# Patient Record
Sex: MALE | Race: BLACK OR AFRICAN AMERICAN | NOT HISPANIC OR LATINO | URBAN - METROPOLITAN AREA
[De-identification: names, ages, dates, MRNs, and addresses within clinical notes are randomized per-mention and may not be internally consistent; named-entity substitution may affect disease eponyms.]

---

## 2017-12-17 ENCOUNTER — EMERGENCY (EMERGENCY)
Facility: HOSPITAL | Age: 42
LOS: 1 days | Discharge: HOME | End: 2017-12-17
Attending: EMERGENCY MEDICINE | Admitting: EMERGENCY MEDICINE
Payer: SELF-PAY

## 2017-12-17 VITALS
HEART RATE: 75 BPM | DIASTOLIC BLOOD PRESSURE: 80 MMHG | TEMPERATURE: 98 F | RESPIRATION RATE: 18 BRPM | WEIGHT: 154.98 LBS | OXYGEN SATURATION: 99 % | SYSTOLIC BLOOD PRESSURE: 124 MMHG

## 2017-12-17 PROCEDURE — 99283 EMERGENCY DEPT VISIT LOW MDM: CPT | Mod: 25

## 2017-12-17 PROCEDURE — 73630 X-RAY EXAM OF FOOT: CPT | Mod: 26,RT

## 2017-12-17 RX ORDER — IBUPROFEN 200 MG
600 TABLET ORAL ONCE
Qty: 0 | Refills: 0 | Status: COMPLETED | OUTPATIENT
Start: 2017-12-17 | End: 2017-12-17

## 2017-12-17 RX ORDER — IBUPROFEN 200 MG
1 TABLET ORAL
Qty: 20 | Refills: 0
Start: 2017-12-17 | End: 2017-12-21

## 2017-12-17 RX ADMIN — Medication 600 MILLIGRAM(S): at 13:27

## 2017-12-17 NOTE — ED PROVIDER NOTE - MUSCULOSKELETAL, MLM
RLE: fluctuance cystic lesion to dorsum of R foot in the mid foot area with no surrounding erythema, mildly tender, normal dorsalis pedis pulse, normal sensation distally.

## 2017-12-17 NOTE — ED PROVIDER NOTE - DIAGNOSTIC INTERPRETATION
Interpreted by ED physician  Foot R x-ray, 3 views  No fracture, no dislocation (joint spaces grossly normal), tissue markers applied by xray tech, + soft tissue swelling

## 2017-12-17 NOTE — ED PROVIDER NOTE - MEDICAL DECISION MAKING DETAILS
Most likely a cyst. Explained that will not be drained emergently and that he should follow up with podiatry.

## 2017-12-17 NOTE — ED PROVIDER NOTE - PROGRESS NOTE DETAILS
soft tissue defect, most likely cystic, does not seem like an abscess, no need for emergent drainage. F/U w Podiatry recommended. motrin for pain

## 2017-12-17 NOTE — ED PROVIDER NOTE - OBJECTIVE STATEMENT
37 y/o M presents w/ bump to the top of R foot x2-3 months, now w/ pain x1 week, worse with movement of toes and standing. No fever or chills. No trauma or injury. No numbness/tingling. No redness, warmth, drainage.

## 2017-12-21 DIAGNOSIS — M79.671 PAIN IN RIGHT FOOT: ICD-10-CM

## 2017-12-21 DIAGNOSIS — L72.8 OTHER FOLLICULAR CYSTS OF THE SKIN AND SUBCUTANEOUS TISSUE: ICD-10-CM

## 2018-03-08 ENCOUNTER — EMERGENCY (EMERGENCY)
Facility: HOSPITAL | Age: 43
LOS: 1 days | Discharge: HOME | End: 2018-03-08
Attending: EMERGENCY MEDICINE | Admitting: EMERGENCY MEDICINE

## 2018-03-08 VITALS
OXYGEN SATURATION: 98 % | HEART RATE: 71 BPM | RESPIRATION RATE: 16 BRPM | SYSTOLIC BLOOD PRESSURE: 139 MMHG | TEMPERATURE: 98 F | DIASTOLIC BLOOD PRESSURE: 90 MMHG

## 2018-03-08 DIAGNOSIS — R07.89 OTHER CHEST PAIN: ICD-10-CM

## 2018-03-08 NOTE — ED ADULT TRIAGE NOTE - CHIEF COMPLAINT QUOTE
pt presents with chest pain for the past week, denies any cardiac history, states that he has had a cough.

## 2018-03-09 ENCOUNTER — EMERGENCY (EMERGENCY)
Facility: HOSPITAL | Age: 43
LOS: 1 days | Discharge: HOME | End: 2018-03-09
Admitting: EMERGENCY MEDICINE
Payer: SELF-PAY

## 2018-03-09 VITALS
DIASTOLIC BLOOD PRESSURE: 76 MMHG | RESPIRATION RATE: 16 BRPM | OXYGEN SATURATION: 99 % | HEART RATE: 81 BPM | TEMPERATURE: 98 F | SYSTOLIC BLOOD PRESSURE: 143 MMHG

## 2018-03-09 DIAGNOSIS — R07.89 OTHER CHEST PAIN: ICD-10-CM

## 2018-03-09 DIAGNOSIS — R07.9 CHEST PAIN, UNSPECIFIED: ICD-10-CM

## 2018-03-09 DIAGNOSIS — R50.9 FEVER, UNSPECIFIED: ICD-10-CM

## 2018-03-09 DIAGNOSIS — R05 COUGH: ICD-10-CM

## 2018-03-09 DIAGNOSIS — F17.210 NICOTINE DEPENDENCE, CIGARETTES, UNCOMPLICATED: ICD-10-CM

## 2018-03-09 LAB
ALBUMIN SERPL ELPH-MCNC: 3.8 G/DL — SIGNIFICANT CHANGE UP (ref 3.4–5)
ALP SERPL-CCNC: 99 U/L — SIGNIFICANT CHANGE UP (ref 40–120)
ALT FLD-CCNC: 28 U/L — SIGNIFICANT CHANGE UP (ref 12–42)
ANION GAP SERPL CALC-SCNC: 11 MMOL/L — SIGNIFICANT CHANGE UP (ref 9–16)
AST SERPL-CCNC: 23 U/L — SIGNIFICANT CHANGE UP (ref 15–37)
BASOPHILS NFR BLD AUTO: 0.8 % — SIGNIFICANT CHANGE UP (ref 0–2)
BILIRUB SERPL-MCNC: 0.4 MG/DL — SIGNIFICANT CHANGE UP (ref 0.2–1.2)
BUN SERPL-MCNC: 18 MG/DL — SIGNIFICANT CHANGE UP (ref 7–23)
CALCIUM SERPL-MCNC: 9 MG/DL — SIGNIFICANT CHANGE UP (ref 8.5–10.5)
CHLORIDE SERPL-SCNC: 103 MMOL/L — SIGNIFICANT CHANGE UP (ref 96–108)
CO2 SERPL-SCNC: 24 MMOL/L — SIGNIFICANT CHANGE UP (ref 22–31)
CREAT SERPL-MCNC: 0.89 MG/DL — SIGNIFICANT CHANGE UP (ref 0.5–1.3)
EOSINOPHIL NFR BLD AUTO: 4 % — SIGNIFICANT CHANGE UP (ref 0–6)
GLUCOSE SERPL-MCNC: 98 MG/DL — SIGNIFICANT CHANGE UP (ref 70–99)
HCT VFR BLD CALC: 41.5 % — SIGNIFICANT CHANGE UP (ref 39–50)
HGB BLD-MCNC: 13.9 G/DL — SIGNIFICANT CHANGE UP (ref 13–17)
IMM GRANULOCYTES NFR BLD AUTO: 0.4 % — SIGNIFICANT CHANGE UP (ref 0–1.5)
LYMPHOCYTES # BLD AUTO: 31.4 % — SIGNIFICANT CHANGE UP (ref 13–44)
MCHC RBC-ENTMCNC: 30.3 PG — SIGNIFICANT CHANGE UP (ref 27–34)
MCHC RBC-ENTMCNC: 33.5 G/DL — SIGNIFICANT CHANGE UP (ref 32–36)
MCV RBC AUTO: 90.6 FL — SIGNIFICANT CHANGE UP (ref 80–100)
MONOCYTES NFR BLD AUTO: 11.2 % — SIGNIFICANT CHANGE UP (ref 2–14)
NEUTROPHILS NFR BLD AUTO: 52.2 % — SIGNIFICANT CHANGE UP (ref 43–77)
PLATELET # BLD AUTO: 198 K/UL — SIGNIFICANT CHANGE UP (ref 150–400)
POTASSIUM SERPL-MCNC: 3.9 MMOL/L — SIGNIFICANT CHANGE UP (ref 3.5–5.3)
POTASSIUM SERPL-SCNC: 3.9 MMOL/L — SIGNIFICANT CHANGE UP (ref 3.5–5.3)
PROT SERPL-MCNC: 7.6 G/DL — SIGNIFICANT CHANGE UP (ref 6.4–8.2)
RBC # BLD: 4.58 M/UL — SIGNIFICANT CHANGE UP (ref 4.2–5.8)
RBC # FLD: 12.4 % — SIGNIFICANT CHANGE UP (ref 10.3–16.9)
SODIUM SERPL-SCNC: 138 MMOL/L — SIGNIFICANT CHANGE UP (ref 132–145)
TROPONIN I SERPL-MCNC: <0.017 NG/ML — LOW (ref 0.02–0.06)
WBC # BLD: 7.1 K/UL — SIGNIFICANT CHANGE UP (ref 3.8–10.5)
WBC # FLD AUTO: 7.1 K/UL — SIGNIFICANT CHANGE UP (ref 3.8–10.5)

## 2018-03-09 PROCEDURE — 99053 MED SERV 10PM-8AM 24 HR FAC: CPT

## 2018-03-09 PROCEDURE — 71046 X-RAY EXAM CHEST 2 VIEWS: CPT | Mod: 26

## 2018-03-09 PROCEDURE — 99285 EMERGENCY DEPT VISIT HI MDM: CPT | Mod: 25

## 2018-03-09 PROCEDURE — 93010 ELECTROCARDIOGRAM REPORT: CPT

## 2018-03-09 RX ORDER — KETOROLAC TROMETHAMINE 30 MG/ML
60 SYRINGE (ML) INJECTION ONCE
Qty: 0 | Refills: 0 | Status: DISCONTINUED | OUTPATIENT
Start: 2018-03-09 | End: 2018-03-09

## 2018-03-09 RX ADMIN — Medication 60 MILLIGRAM(S): at 05:15

## 2018-03-09 NOTE — ED ADULT NURSE REASSESSMENT NOTE - NS ED NURSE REASSESS COMMENT FT1
after blood collection, pt wants to leave the ed to get food, states he'll come back in 5 minutes. advised that he's not allowed while tx is in progress, pt insisted to leave. provider notified.

## 2018-03-09 NOTE — ED PROVIDER NOTE - MEDICAL DECISION MAKING DETAILS
EKG NSR, no acute ischemic changes. CXR shows no acute cardiopulmonary pathology, troponin negative. Pt was given Toradol 60mg IM in ED with improvement. A&Ox3. NAD. Sitting comfortably with no other complaints at this time. Will d/C with instructions to F/U with Cardio this week. Strict return precautions reviewed with pt in which pt verbalizes understanding and agrees to.

## 2018-03-09 NOTE — ED PROVIDER NOTE - OBJECTIVE STATEMENT
33 y/o M with no known significant PMH presents to ED with 3 days of intermittent chest "tightness and soreness". The discomfort is worse with movement and heavy lifting. He has not taken any medication for the pain. He states that he is an  and has been consistently lifting heavy equipment and supplies for the past week. He also reports having a "cold" for the past week with subjective fever, chills and dry cough. He reports coming here earlier today for same complaints but states he had to leave to go to work. Discomfort is described as mild at this time.    Denies headache, dizziness, syncope, SOB, palpitations, wheezing, hemoptysis, abdo pain, N/V/D, calf pain or swelling, recent travel or injury

## 2018-04-05 ENCOUNTER — EMERGENCY (EMERGENCY)
Facility: HOSPITAL | Age: 43
LOS: 1 days | Discharge: HOME | End: 2018-04-05
Attending: EMERGENCY MEDICINE | Admitting: EMERGENCY MEDICINE
Payer: SELF-PAY

## 2018-04-05 VITALS
HEART RATE: 58 BPM | RESPIRATION RATE: 16 BRPM | SYSTOLIC BLOOD PRESSURE: 152 MMHG | OXYGEN SATURATION: 100 % | TEMPERATURE: 99 F | WEIGHT: 160.06 LBS | DIASTOLIC BLOOD PRESSURE: 76 MMHG

## 2018-04-05 DIAGNOSIS — K08.89 OTHER SPECIFIED DISORDERS OF TEETH AND SUPPORTING STRUCTURES: ICD-10-CM

## 2018-04-05 PROCEDURE — 99283 EMERGENCY DEPT VISIT LOW MDM: CPT

## 2018-04-05 RX ORDER — IBUPROFEN 200 MG
600 TABLET ORAL ONCE
Qty: 0 | Refills: 0 | Status: COMPLETED | OUTPATIENT
Start: 2018-04-05 | End: 2018-04-05

## 2018-04-05 RX ORDER — PENICILLIN V POTASSIUM 250 MG
500 TABLET ORAL ONCE
Qty: 0 | Refills: 0 | Status: COMPLETED | OUTPATIENT
Start: 2018-04-05 | End: 2018-04-05

## 2018-04-05 RX ORDER — ACETAMINOPHEN WITH CODEINE 300MG-30MG
1 TABLET ORAL EVERY 4 HOURS
Qty: 0 | Refills: 0 | Status: DISCONTINUED | OUTPATIENT
Start: 2018-04-05 | End: 2018-04-05

## 2018-04-05 RX ORDER — IBUPROFEN 200 MG
1 TABLET ORAL
Qty: 15 | Refills: 0 | OUTPATIENT
Start: 2018-04-05 | End: 2018-04-09

## 2018-04-05 RX ORDER — PENICILLIN V POTASSIUM 250 MG
1 TABLET ORAL
Qty: 30 | Refills: 0 | OUTPATIENT
Start: 2018-04-05 | End: 2018-04-14

## 2018-04-05 RX ADMIN — Medication 1 TABLET(S): at 16:46

## 2018-04-05 RX ADMIN — Medication 500 MILLIGRAM(S): at 16:47

## 2018-04-05 RX ADMIN — Medication 600 MILLIGRAM(S): at 16:46

## 2018-04-05 NOTE — ED ADULT TRIAGE NOTE - CHIEF COMPLAINT QUOTE
Patient presents to ED c/o right upper toothache x 3 weeks. Patient has been taking OTC pain medication but now it does not help. Denies fever/chills.

## 2018-05-10 ENCOUNTER — EMERGENCY (EMERGENCY)
Facility: HOSPITAL | Age: 43
LOS: 1 days | Discharge: HOME | End: 2018-05-10
Admitting: EMERGENCY MEDICINE
Payer: OTHER MISCELLANEOUS

## 2018-05-10 VITALS
RESPIRATION RATE: 16 BRPM | OXYGEN SATURATION: 100 % | SYSTOLIC BLOOD PRESSURE: 118 MMHG | TEMPERATURE: 98 F | HEART RATE: 74 BPM | DIASTOLIC BLOOD PRESSURE: 81 MMHG
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
## 2018-05-10 DIAGNOSIS — S60.221A CONTUSION OF RIGHT HAND, INITIAL ENCOUNTER: ICD-10-CM
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
## 2018-05-10 DIAGNOSIS — Z98.890 OTHER SPECIFIED POSTPROCEDURAL STATES: Chronic | ICD-10-CM

## 2018-05-10 DIAGNOSIS — F17.200 NICOTINE DEPENDENCE, UNSPECIFIED, UNCOMPLICATED: ICD-10-CM

## 2018-05-10 DIAGNOSIS — Z23 ENCOUNTER FOR IMMUNIZATION: ICD-10-CM

## 2018-05-10 DIAGNOSIS — M79.641 PAIN IN RIGHT HAND: ICD-10-CM

## 2018-05-10 PROCEDURE — 73130 X-RAY EXAM OF HAND: CPT | Mod: 26,RT

## 2018-05-10 PROCEDURE — 99283 EMERGENCY DEPT VISIT LOW MDM: CPT | Mod: 25

## 2018-05-10 RX ORDER — BACITRACIN ZINC 500 UNIT/G
1 OINTMENT IN PACKET (EA) TOPICAL ONCE
Qty: 0 | Refills: 0 | Status: COMPLETED | OUTPATIENT
Start: 2018-05-10 | End: 2018-05-10

## 2018-05-10 RX ORDER — IBUPROFEN 200 MG
1 TABLET ORAL
Qty: 20 | Refills: 0
Start: 2018-05-10

## 2018-05-10 RX ORDER — IBUPROFEN 200 MG
800 TABLET ORAL ONCE
Qty: 0 | Refills: 0 | Status: COMPLETED | OUTPATIENT
Start: 2018-05-10 | End: 2018-05-10

## 2018-05-10 RX ORDER — TETANUS TOXOID, REDUCED DIPHTHERIA TOXOID AND ACELLULAR PERTUSSIS VACCINE, ADSORBED 5; 2.5; 8; 8; 2.5 [IU]/.5ML; [IU]/.5ML; UG/.5ML; UG/.5ML; UG/.5ML
0.5 SUSPENSION INTRAMUSCULAR ONCE
Qty: 0 | Refills: 0 | Status: COMPLETED | OUTPATIENT
Start: 2018-05-10 | End: 2018-05-10

## 2018-05-10 RX ADMIN — TETANUS TOXOID, REDUCED DIPHTHERIA TOXOID AND ACELLULAR PERTUSSIS VACCINE, ADSORBED 0.5 MILLILITER(S): 5; 2.5; 8; 8; 2.5 SUSPENSION INTRAMUSCULAR at 08:49

## 2018-05-10 RX ADMIN — Medication 800 MILLIGRAM(S): at 08:54

## 2018-05-10 RX ADMIN — Medication 1 APPLICATION(S): at 09:14

## 2018-05-10 RX ADMIN — Medication 800 MILLIGRAM(S): at 08:49

## 2018-05-10 NOTE — ED PROVIDER NOTE - MEDICAL DECISION MAKING DETAILS
pt with mechanical fall, no associated prodromes, no focal neuro deficits, Xray negative for acute fx or bony injury, wound cleansed and bacitracin applied, ACE wrap applied, encouraged RICE to affected region, weight bear as tolerated, f/u ortho, pt verbalized understanding.

## 2018-05-10 NOTE — ED PROVIDER NOTE - OBJECTIVE STATEMENT
33 yo M with no known PMHx, RHD, last tetanus unknown, presenting c/o R hand pain s/p fall at work. Pt tripped and tried to break his fall, fell on closed fist and sustained injury to the R hand region. Now with pain and swelling with difficulty with ROM.  Denies head trauma, LOC, paresthesia, numbness, tingling, redness, purulent d/c, HA, dizziness, SOB, CP, palpitations, N/V, focal weakness, neck/back pain, and malaise. Pt is ambulatory s/p fall

## 2018-05-10 NOTE — ED PROVIDER NOTE - CARE PLAN
Principal Discharge DX:	Hand contusion  Secondary Diagnosis:	Abrasion  Secondary Diagnosis:	Fall, initial encounter

## 2018-05-10 NOTE — ED PROVIDER NOTE - DIAGNOSTIC INTERPRETATION
Xray (wet reads) interpreted by KERI THORNTON   Xray hand 3 Views - +soft tissue swelling. no acute fx or dislocation, joint space intact, no effusion noted. No foreign body noted

## 2018-05-10 NOTE — ED PROVIDER NOTE - PHYSICAL EXAMINATION
Vital Signs - nursing notes reviewed and confirmed  Gen - WDWN, NAD, comfortable and non-toxic appearing, speaking in full sentences   Skin - warm, dry, mild abrasion to the R 3rd MCP region with no active bleeding noted   HEENT - AT/NC, PERRL, EOMI, no conjunctival injection, moist oral mucosa, o/p clear with no erythema, edema, or exudate, uvula midline, airway patent, neck supple and NT, FROM, no JVD or carotid bruits b/l, no palpable nodes  CV - S1S2, R/R/R  Resp - respiration non-labored, CTAB, symmetric bs b/l, no r/r/w  GI - NABS, soft, ND, NT, no rebound or guarding, no CVAT b/l   MS - w/w/p, R hand +edema and TTP over the 3rd MCP with no erythema, ecchymosis, crepitus, joint laxity, or deformity, restricted ROM 2/2 pain, NV intact. neg snuff box tenderness   Neuro - AxOx3, no focal neuro deficits, ambulatory without gait disturbance

## 2018-05-15 ENCOUNTER — EMERGENCY (EMERGENCY)
Facility: HOSPITAL | Age: 43
LOS: 1 days | Discharge: HOME | End: 2018-05-15
Admitting: EMERGENCY MEDICINE
Payer: SELF-PAY

## 2018-05-15 VITALS
DIASTOLIC BLOOD PRESSURE: 84 MMHG | SYSTOLIC BLOOD PRESSURE: 128 MMHG | RESPIRATION RATE: 16 BRPM | OXYGEN SATURATION: 97 % | TEMPERATURE: 98 F | HEART RATE: 83 BPM

## 2018-05-15 VITALS
HEART RATE: 78 BPM | DIASTOLIC BLOOD PRESSURE: 78 MMHG | RESPIRATION RATE: 17 BRPM | OXYGEN SATURATION: 98 % | SYSTOLIC BLOOD PRESSURE: 119 MMHG

## 2018-05-15 DIAGNOSIS — Z98.890 OTHER SPECIFIED POSTPROCEDURAL STATES: Chronic | ICD-10-CM

## 2018-05-15 DIAGNOSIS — M79.641 PAIN IN RIGHT HAND: ICD-10-CM

## 2018-05-15 DIAGNOSIS — Z79.1 LONG TERM (CURRENT) USE OF NON-STEROIDAL ANTI-INFLAMMATORIES (NSAID): ICD-10-CM

## 2018-05-15 DIAGNOSIS — F17.200 NICOTINE DEPENDENCE, UNSPECIFIED, UNCOMPLICATED: ICD-10-CM

## 2018-05-15 PROCEDURE — 99282 EMERGENCY DEPT VISIT SF MDM: CPT

## 2018-05-15 NOTE — ED PROVIDER NOTE - OBJECTIVE STATEMENT
Pt. was seen @ [facility] on 5/10 for rt hand injury at work. x- ray neg, pt returning today to ER requesting additional time off of work due to persistent hand pain. pt. denies any new injury, no fever/chills.

## 2018-05-15 NOTE — ED PROVIDER NOTE - SKIN, MLM
rt hand: + abrasion to 3 rd mcp , wound clean and dry,  no purulent  d/c, , FROM , no deformity,  Skin normal color for race, warm, dry and intact. No evidence of rash.

## 2018-05-15 NOTE — ED PROVIDER NOTE - MEDICAL DECISION MAKING DETAILS
pt. here for wound check requesting addition days off of work, wound clean and dry, advised to f/u with workers comp, will refer to hand specialist.

## 2018-05-15 NOTE — ED ADULT NURSE NOTE - OBJECTIVE STATEMENT
pt is a 31 y/o male who comes into the ED complaining of right hand pain. pt was here last week for hand injury at work and was given a work note until monday, pt states "the pain is still really bad and I need more time from work, I am an  and can't do it". pt denies fevers, chills. pt has full ROM to hand but complains of 10/10 pain. pt has abrasion to right knuckle from the initial injury, no purulent drainage, wound is dry and clean.

## 2018-09-06 ENCOUNTER — EMERGENCY (EMERGENCY)
Facility: HOSPITAL | Age: 43
LOS: 1 days | Discharge: HOME | End: 2018-09-06
Attending: EMERGENCY MEDICINE | Admitting: EMERGENCY MEDICINE
Payer: SELF-PAY

## 2018-09-06 VITALS — WEIGHT: 160.06 LBS

## 2018-09-06 VITALS
OXYGEN SATURATION: 98 % | TEMPERATURE: 98 F | HEART RATE: 74 BPM | SYSTOLIC BLOOD PRESSURE: 114 MMHG | RESPIRATION RATE: 18 BRPM | DIASTOLIC BLOOD PRESSURE: 73 MMHG
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
## 2018-09-06 DIAGNOSIS — Z79.1 LONG TERM (CURRENT) USE OF NON-STEROIDAL ANTI-INFLAMMATORIES (NSAID): ICD-10-CM

## 2018-09-06 DIAGNOSIS — S02.5XXA FRACTURE OF TOOTH (TRAUMATIC), INITIAL ENCOUNTER FOR CLOSED FRACTURE: ICD-10-CM

## 2018-09-06 DIAGNOSIS — K08.89 OTHER SPECIFIED DISORDERS OF TEETH AND SUPPORTING STRUCTURES: ICD-10-CM
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
## 2018-09-06 DIAGNOSIS — Z98.890 OTHER SPECIFIED POSTPROCEDURAL STATES: Chronic | ICD-10-CM
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
## 2018-09-06 DIAGNOSIS — F17.200 NICOTINE DEPENDENCE, UNSPECIFIED, UNCOMPLICATED: ICD-10-CM

## 2018-09-06 PROCEDURE — 99283 EMERGENCY DEPT VISIT LOW MDM: CPT

## 2018-09-06 RX ORDER — ACETAMINOPHEN WITH CODEINE 300MG-30MG
1 TABLET ORAL ONCE
Qty: 0 | Refills: 0 | Status: DISCONTINUED | OUTPATIENT
Start: 2018-09-06 | End: 2018-09-06

## 2018-09-06 RX ORDER — IBUPROFEN 200 MG
600 TABLET ORAL ONCE
Qty: 0 | Refills: 0 | Status: COMPLETED | OUTPATIENT
Start: 2018-09-06 | End: 2018-09-06

## 2018-09-06 RX ADMIN — Medication 1 TABLET(S): at 14:51

## 2018-09-06 RX ADMIN — Medication 600 MILLIGRAM(S): at 14:51

## 2018-09-06 NOTE — ED PROVIDER NOTE - MEDICAL DECISION MAKING DETAILS
Will refer pt to [facility] clinic, instructed pt to go to [facility] tomorrow at 8:30 for follow up and give work note. Will refer pt to [facility] dental clinic, instructed pt to go to [facility] tomorrow at 8:30 for follow up and give work note.  Will give pain medications.  Instructed on diet modifications.

## 2018-09-06 NOTE — ED ADULT TRIAGE NOTE - CCCP TRG CHIEF CMPLNT
toothache
Quality 47: Advance Care Plan: Advance Care Planning discussed and documented in the medical record; patient did not wish or was not able to name a surrogate decision maker or provide an advance care plan.
Quality 130: Documentation Of Current Medications In The Medical Record: Current Medications Documented
Quality 110: Preventive Care And Screening: Influenza Immunization: Influenza Immunization not Administered because Patient Refused.
Quality 226: Preventive Care And Screening: Tobacco Use: Screening And Cessation Intervention: Patient screened for tobacco and never smoked
Quality 111:Pneumonia Vaccination Status For Older Adults: Pneumococcal Vaccination Administered
Quality 431: Preventive Care And Screening: Unhealthy Alcohol Use - Screening: Patient screened for unhealthy alcohol use using a single question and scores less than 2 times per year
Detail Level: Detailed

## 2018-09-06 NOTE — ED PROVIDER NOTE - PHYSICAL EXAMINATION
GEN: Well appearing, well nourished, awake, alert, oriented to person, place, time/situation and in no apparent distress.  ENT: Airway patent, Nasal mucosa clear. Mouth with normal mucosa.  EYES: Clear bilaterally.  RESPIRATORY: Breathing comfortably with normal RR.  MSK: Range of motion is not limited, no deformities noted.  NEURO: Alert and oriented, no focal deficits.  SKIN: Skin normal color for race, warm, dry and intact. No evidence of rash.  PSYCH: Alert and oriented to person, place, time/situation. normal mood and affect.   MOUTH/DENTAL: Right tooth #5 with anterior chipped tooth. No loose dentition, no erythema, no open pulp seen.
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
## 2018-09-06 NOTE — ED ADULT NURSE NOTE - NSIMPLEMENTINTERV_GEN_ALL_ED
Implemented All Universal Safety Interventions:  [place] to call system. Call bell, personal items and telephone within reach. Instruct patient to call for assistance. Room bathroom lighting operational. Non-slip footwear when patient is off stretcher. Physically safe environment: no spills, clutter or unnecessary equipment. Stretcher in lowest position, wheels locked, appropriate side rails in place.

## 2018-11-15 ENCOUNTER — EMERGENCY (EMERGENCY)
Facility: HOSPITAL | Age: 43
LOS: 1 days | Discharge: HOME | End: 2018-11-15
Attending: EMERGENCY MEDICINE | Admitting: EMERGENCY MEDICINE
Payer: OTHER MISCELLANEOUS

## 2018-11-15 VITALS
HEART RATE: 60 BPM | DIASTOLIC BLOOD PRESSURE: 55 MMHG | TEMPERATURE: 98 F | RESPIRATION RATE: 18 BRPM | OXYGEN SATURATION: 100 % | SYSTOLIC BLOOD PRESSURE: 100 MMHG

## 2018-11-15 DIAGNOSIS — R07.89 OTHER CHEST PAIN: ICD-10-CM

## 2018-11-15 DIAGNOSIS — M54.9 DORSALGIA, UNSPECIFIED: ICD-10-CM

## 2018-11-15 DIAGNOSIS — Z98.890 OTHER SPECIFIED POSTPROCEDURAL STATES: Chronic | ICD-10-CM

## 2018-11-15 DIAGNOSIS — Z79.1 LONG TERM (CURRENT) USE OF NON-STEROIDAL ANTI-INFLAMMATORIES (NSAID): ICD-10-CM

## 2018-11-15 PROCEDURE — 99283 EMERGENCY DEPT VISIT LOW MDM: CPT | Mod: 25

## 2018-11-15 PROCEDURE — 71045 X-RAY EXAM CHEST 1 VIEW: CPT | Mod: 26

## 2018-11-15 PROCEDURE — 71101 X-RAY EXAM UNILAT RIBS/CHEST: CPT | Mod: 26,LT

## 2018-11-15 RX ORDER — LIDOCAINE 4 G/100G
1 CREAM TOPICAL ONCE
Qty: 0 | Refills: 0 | Status: COMPLETED | OUTPATIENT
Start: 2018-11-15 | End: 2018-11-15

## 2018-11-15 RX ORDER — IBUPROFEN 200 MG
600 TABLET ORAL ONCE
Qty: 0 | Refills: 0 | Status: COMPLETED | OUTPATIENT
Start: 2018-11-15 | End: 2018-11-15

## 2018-11-15 RX ORDER — LIDOCAINE 4 G/100G
1 CREAM TOPICAL
Qty: 7 | Refills: 0
Start: 2018-11-15 | End: 2018-11-21

## 2018-11-15 RX ORDER — IBUPROFEN 200 MG
1 TABLET ORAL
Qty: 28 | Refills: 0
Start: 2018-11-15 | End: 2018-11-21

## 2018-11-15 RX ORDER — IBUPROFEN 200 MG
1 TABLET ORAL
Qty: 28 | Refills: 0 | OUTPATIENT
Start: 2018-11-15 | End: 2018-11-21

## 2018-11-15 RX ADMIN — LIDOCAINE 1 PATCH: 4 CREAM TOPICAL at 14:00

## 2018-11-15 RX ADMIN — Medication 600 MILLIGRAM(S): at 14:00

## 2018-11-15 NOTE — ED PROVIDER NOTE - PROGRESS NOTE DETAILS
pain improved.  Rib films unremarkable.  No PTX, no discrete acute rib fractures.  Conservative management discussed with the patient in detail.  Close PMD follow up encouraged.  Strict ED return instructions discussed in detail and patient given the opportunity to ask any questions about their discharge diagnosis and instructions

## 2018-11-15 NOTE — ED ADULT TRIAGE NOTE - CHIEF COMPLAINT QUOTE
Patient to ED with left sided pain s/p fall all down left side.  Patient with no LOC and no difficulty bearing weight

## 2018-11-15 NOTE — ED ADULT NURSE NOTE - NSIMPLEMENTINTERV_GEN_ALL_ED
Implemented All Universal Safety Interventions:  [place] to call system. Call bell, personal items and telephone within reach. Instruct patient to call for assistance. Room bathroom lighting operational. Non-slip footwear when patient is off stretcher. Physically safe environment: no spills, clutter or unnecessary equipment. Stretcher in lowest position, wheels locked, appropriate side rails in place.

## 2018-11-15 NOTE — ED PROVIDER NOTE - DIAGNOSTIC INTERPRETATION
ER Physician: Jose Raul Castaneda  CHEST XRAY INTERPRETATION: lungs clear, heart shadow normal, bony structures intact

## 2018-11-15 NOTE — ED PROVIDER NOTE - MEDICAL DECISION MAKING DETAILS
Atraumatic L lateral back pain.  No ecchymosis or bony tenderness.  Lungs clear, no hypoxia or tachypnea.  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  Ribs films, pain medication ordered.

## 2018-11-15 NOTE — ED PROVIDER NOTE - OBJECTIVE STATEMENT
Presents with L sided back pain.  Started yesterday, constant, sharp, mild, no radiation.  Worse with bending or twisting and coughing.  Denies SOB, fever or chills.  Fell backwards yesterday but was without pain and symptoms did not start until much later in the day.  Did not take any medication prior to coming to the ER.  Denies hx of kidney stones.  Denies pertinent family history

## 2019-01-05 ENCOUNTER — EMERGENCY (EMERGENCY)
Facility: HOSPITAL | Age: 44
LOS: 1 days | Discharge: HOME | End: 2019-01-05
Admitting: EMERGENCY MEDICINE
Payer: OTHER GOVERNMENT

## 2019-01-05 VITALS
OXYGEN SATURATION: 98 % | TEMPERATURE: 99 F | RESPIRATION RATE: 16 BRPM | DIASTOLIC BLOOD PRESSURE: 79 MMHG | SYSTOLIC BLOOD PRESSURE: 150 MMHG | HEART RATE: 100 BPM

## 2019-01-05 DIAGNOSIS — S02.92XA UNSPECIFIED FRACTURE OF FACIAL BONES, INITIAL ENCOUNTER FOR CLOSED FRACTURE: ICD-10-CM
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
## 2019-01-05 DIAGNOSIS — R51 HEADACHE: ICD-10-CM

## 2019-01-05 DIAGNOSIS — S20.212A CONTUSION OF LEFT FRONT WALL OF THORAX, INITIAL ENCOUNTER: ICD-10-CM

## 2019-01-05 DIAGNOSIS — Z98.890 OTHER SPECIFIED POSTPROCEDURAL STATES: Chronic | ICD-10-CM
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
## 2019-01-05 DIAGNOSIS — Z79.1 LONG TERM (CURRENT) USE OF NON-STEROIDAL ANTI-INFLAMMATORIES (NSAID): ICD-10-CM

## 2019-01-05 DIAGNOSIS — H11.31 CONJUNCTIVAL HEMORRHAGE, RIGHT EYE: ICD-10-CM
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
## 2019-01-05 PROCEDURE — 99284 EMERGENCY DEPT VISIT MOD MDM: CPT | Mod: 25

## 2019-01-05 PROCEDURE — 99053 MED SERV 10PM-8AM 24 HR FAC: CPT
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
## 2019-01-05 NOTE — ED ADULT NURSE NOTE - NSIMPLEMENTINTERV_GEN_ALL_ED
Implemented All Universal Safety Interventions:  [place] to call system. Call bell, personal items and telephone within reach. Instruct patient to call for assistance. Room bathroom lighting operational. Non-slip footwear when patient is off stretcher. Physically safe environment: no spills, clutter or unnecessary equipment. Stretcher in lowest position, wheels locked, appropriate side rails in place.

## 2019-01-05 NOTE — ED ADULT TRIAGE NOTE - BP NONINVASIVE DIASTOLIC (MM HG)
SUBJECTIVE:     CC: Sal Aldrich is an 52 [place] Flap Takedown ; Surgeon:MANFRED MUNIZ; 79

## 2019-01-06 PROCEDURE — 70486 CT MAXILLOFACIAL W/O DYE: CPT | Mod: 26

## 2019-01-06 PROCEDURE — 71250 CT THORAX DX C-: CPT | Mod: 26

## 2019-01-06 PROCEDURE — 70450 CT HEAD/BRAIN W/O DYE: CPT | Mod: 26

## 2019-01-06 RX ORDER — KETOROLAC TROMETHAMINE 30 MG/ML
60 SYRINGE (ML) INJECTION ONCE
Qty: 0 | Refills: 0 | Status: DISCONTINUED | OUTPATIENT
Start: 2019-01-06 | End: 2019-01-06

## 2019-01-06 RX ADMIN — Medication 60 MILLIGRAM(S): at 01:51
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
## 2019-01-06 NOTE — ED PROVIDER NOTE - NSFOLLOWUPCLINICS_GEN_ALL_ED_FT
[facility]  ENT  [address] [place] [address]  Phone: [phone]  Fax: [phone]  Follow Up Time: 1-3 Days

## 2019-01-06 NOTE — ED PROVIDER NOTE - CARE PROVIDER_API CALL
Hu Sutton (MD), Plastic Surgery Surgery  [address] [address]  Phone: [phone]  Fax: [phone]    Jr Simon (MD; DDS), OralMaxillofacial Surgery  [address]  [place] [address]  Phone: [phone]  Fax: [phone]

## 2019-01-06 NOTE — ED PROVIDER NOTE - EYES, MLM
Clear bilaterally, pupils equal, round and reactive to light. EOMI B/L. + subconjunctival hemorrhage to left eye. No hyphema or chemosis.

## 2019-01-06 NOTE — ED PROVIDER NOTE - NSFOLLOWUPINSTRUCTIONS_ED_ALL_ED_FT
FOLLOW UP WITH EITHER THE [facility] FACIAL RECONSTRUCTION, DR. ORTIZ, OR DR. URIBE IN 1-2 DAYS FOR FURTHER EVALUATION AND MANAGEMENT OF YOUR FACIAL FRACTURES AS DISCUSSED.    RETURN TO THE ER IMMEDIATELY IF YOU HAVE WORSENING PAIN, VISION CHANGES, DIFFICULTY MOVING YOUR EYES, DIZZINESS, VOMITING, OR ANY OTHER CONCERNING SYMPTOMS, OR IF YOU ARE UNABLE TO FOLLOW UP AS INSTRUCTED.

## 2019-07-23 ENCOUNTER — EMERGENCY (EMERGENCY)
Facility: HOSPITAL | Age: 44
LOS: 1 days | Discharge: HOME | End: 2019-07-23
Admitting: EMERGENCY MEDICINE
Payer: SELF-PAY

## 2019-07-23 VITALS
HEIGHT: 65 IN | TEMPERATURE: 98 F | WEIGHT: 164.91 LBS | HEART RATE: 80 BPM | OXYGEN SATURATION: 98 % | DIASTOLIC BLOOD PRESSURE: 77 MMHG | RESPIRATION RATE: 16 BRPM | SYSTOLIC BLOOD PRESSURE: 120 MMHG

## 2019-07-23 DIAGNOSIS — Z98.890 OTHER SPECIFIED POSTPROCEDURAL STATES: Chronic | ICD-10-CM

## 2019-07-23 PROCEDURE — 99283 EMERGENCY DEPT VISIT LOW MDM: CPT

## 2019-07-23 RX ORDER — OFLOXACIN 0.3 %
1 DROPS OPHTHALMIC (EYE) ONCE
Refills: 0 | Status: COMPLETED | OUTPATIENT
Start: 2019-07-23 | End: 2019-07-23

## 2019-07-23 RX ORDER — OFLOXACIN 0.3 %
1 DROPS OPHTHALMIC (EYE) ONCE
Refills: 0 | Status: DISCONTINUED | OUTPATIENT
Start: 2019-07-23 | End: 2019-07-23

## 2019-07-23 RX ADMIN — Medication 1 DROP(S): at 10:54

## 2019-07-23 NOTE — ED PROVIDER NOTE - NS ED ROS FT
Other than symptoms associated with present events the following is reported:  General:  No fever, no chills, no weight loss.  HEENT:  +redness of right eye, +tearing of right eye, no sore throat.   Respiratory: No cough, no dyspnea, no wheeze.  Cardiovascular:  No chest pain, no palpitations, no orthopnea.  GI: No abdominal pain, no nausea/vomiting, no diarrhea.  : No dysuria, no frequency, no urgency.  Musculoskeletal:  No joint pain, no myalgia.  Endocrine:  No generalized weakness, no polyuria.  Neurological:  No headache, no focal weakness.   Psychiatric: No emotional stress, no depression.  Derm:  No rash.  Heme:  No bruising, no bleeding.
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
## 2019-07-23 NOTE — ED PROVIDER NOTE - NSFOLLOWUPINSTRUCTIONS_ED_ALL_ED_FT
-PLEASE FOLLOW-UP WITH YOUR PRIMARY CARE DOCTOR IN 1-2 DAYS.  FOLLOW UP WITH DR. Morfin THIS WEEK. BRING ALL PAPERWORK FROM TODAY'S VISIT TO YOUR FOLLOW-UP VISIT.  IF YOU DO NOT HAVE A PRIMARY CARE DOCTOR PLEASE REFER TO THE OFFICE/CLINIC INFORMATION GIVEN ABOVE.  YOU MAY ALSO CALL [phone] AND ASK FOR MS. ERIN MUÑOZ.  SHE CAN HELP YOU MAKE A FOLLOW-UP APPOINTMENT.  HER HOURS ARE 11AM-7PM MONDAY - FRIDAY.  -APPLY 2 DROPS OF OFLOXACIN TO RIGHT EYE EVERY 4 HOURS WHILE AWAKE.  -TAKE OVER THE COUNTER TYLENOL 650MG BY MOUTH EVERY 4-6 HOURS AS NEEDED FOR PAIN.  DO NOT MIX WITH ALCOHOL OR OTHER PRESCRIPTION MEDICATIONS THAT ALREADY CONTAIN TYLENOL OR ACETAMINOPHEN.   -TAKE OVER THE COUNTER IBUPROFEN 400-600MG BY MOUTH EVERY 8 HOURS AS NEEDED FOR PAIN.  BE SURE TO TAKE WITH FOOD OR MILK AS THIS MEDICATION CAN CAUSE STOMACH IRRITATION.  -PLEASE RETURN TO THE ER IMMEDIATELY OR CALL 911 FOR ANY HIGH FEVER, TROUBLE BREATHING, VOMITING, SEVERE PAIN, OR ANY OTHER CONCERNS.    Conjunctivitis    Conjunctivitis is an inflammation of the clear membrane that covers the white part of your eye and the inner surface of your eyelid (conjunctiva). Symptoms include eye redness, eye pain, tearing and drainage, crusting of eyelids, swollen eyelids, and light sensitivity. Conjunctivitis may be contagious and easily spread from person to person. It can be caused by a virus, bacteria, or as part of an allergic reaction; the treatment depends on the type of conjunctivitis suspected. Avoid touching or rubbing your eyes and wipe away any drainage gently with a warm wet washcloth. Do not wear contact lenses until the inflammation is gone – wear glasses until your health care provider says it is safe to wear contact again. Do not share towels or washcloths that may spread the infection and wash your hands frequently.    SEEK IMMEDIATE MEDICAL CARE IF YOU HAVE ANY OF THE FOLLOWING SYMPTOMS: increasing pain, blurry vision, blindness, fever, or facial pain/redness/swelling.

## 2019-07-23 NOTE — ED PROVIDER NOTE - PHYSICAL EXAMINATION
VITAL SIGNS: I have reviewed nursing notes and confirm.  CONSTITUTIONAL: Well-developed; well-nourished; in no acute distress.  SKIN: Skin is warm and dry, no acute rash.  HEAD: Normocephalic; atraumatic.  ENT: No nasal discharge; airway clear.  NECK: Supple; non tender.  EXT: Normal ROM. No clubbing, cyanosis or edema.  NEURO: Alert, oriented. Grossly unremarkable.  PSYCH: Cooperative, appropriate.     Eyes: Orbits, eyelids, sclera are normal. conjunctiva of R eye is injected. Pupils are equal, round, reactive to light with extraocular movements intact. Vision is grossly intact.  R EYE:   funduscopic: macula present, no hemorrhage, AV nicking or cupping, optic disc shows cup disc ratio of .3, no flaring or blast cells visualized, no vitreal humor leak  PERRL. EOMI- no pain with movement. No chemosis or hyphema. On fluorescein stain, there are no visible abrasions or ulcerations visualized, no FB visualized, and no dendritic lesions visualized. No anisocoria.   visual acuity: grossly intact, far 20/20 OS, 20/20 OD, 20/20 OU    external: brows, lids, and lashes intact and normal appearing  conjunctiva: right conjunctiva injected and pain relieved with tetracaine  other abnormalities: NO strabismus/nystagmus/blepharitis/chalazion/hordeolum/subconjuntival hemorhage/arcus senilis/lenicular opacity/cells in anterior chamber

## 2019-07-23 NOTE — ED ADULT TRIAGE NOTE - CHIEF COMPLAINT QUOTE
pt hit with ball in the face on saturday and now c/o pain to right eye. eye is red. no vision changes. some photosensitivyt

## 2019-07-23 NOTE — ED PROVIDER NOTE - CARE PROVIDER_API CALL
Herson Majano)  Ophthalmology  [address] [address]  Phone: [phone]  Fax: [phone]  Follow Up Time:

## 2019-07-23 NOTE — ED PROVIDER NOTE - CLINICAL SUMMARY MEDICAL DECISION MAKING FREE TEXT BOX
Eye redness/irritation x 3 days- hit with soccer ball. no bony tenderness or sigsn of entrapment. Will do full fundoscopic exam as well as fluorescin stain to check for abrasion. Will cover with Ofloxacin drop sand give f/u with VALENTIN Majano
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
## 2019-07-27 DIAGNOSIS — S05.01XA INJURY OF CONJUNCTIVA AND CORNEAL ABRASION WITHOUT FOREIGN BODY, RIGHT EYE, INITIAL ENCOUNTER: ICD-10-CM
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
## 2019-07-27 DIAGNOSIS — Z79.1 LONG TERM (CURRENT) USE OF NON-STEROIDAL ANTI-INFLAMMATORIES (NSAID): ICD-10-CM
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
## 2019-07-27 DIAGNOSIS — H57.11 OCULAR PAIN, RIGHT EYE: ICD-10-CM

## 2021-10-04 NOTE — ED PROVIDER NOTE - OBJECTIVE STATEMENT
31 y/o M presents to the ED c/o chest pain for the past 2 days. Chest pain is worse with deep breaths or when he bends down. Pt states he has been sick with sx of cough, rhinorrhea, congestion, and sneezing.   cough, rhinorrhea, congestion, and sneezing. Reports shoulder pain before chest pain began, pt attributed it to lifting heavy things. No known fevers. Denies leg pain/swelling. Denies h/o HTN or DM. Reports marijuana use. Denies EtOH, cocaine, or methamphetamine use. normal (ped)...

## 2022-08-04 NOTE — ED ADULT NURSE NOTE - CINV DISCH TEACH PARTICIP
Trenton Parkinson  ([id number])    8/4/2022    Subjective:     Trenton Parkinson is 60 y.o. male who complains today of:    Chief Complaint   Patient presents with    Back Pain    Leg Pain     Left lower         Allergies:  Patient has no known allergies. Past Medical History:   Diagnosis Date    Bronchitis     CAD (coronary artery disease)     past angioplasty > 10 yrs ago    Chronic back pain     Diabetes ([address]     dx > 2 yrs    Dyslipidemia     Hyperlipidemia     meds > 5 yrs    Hypertension     meds > 5 yrs    Neuropathy in diabetes [facility])     Osteoarthritis     Pneumonia     Sickle cell anemia (HCC)     Sleep apnea, obstructive      Past Surgical History:   Procedure Laterality Date    BACK SURGERY  2008    Lumbar disc OR. BLADDER SURGERY  7/10/2015    Patient states he was urininating blood and had a procedure done.     COLONOSCOPY  3/14/14    [facility]    ENDOSCOPY, COLON, DIAGNOSTIC      LUMBAR FUSION  2008    OTHER SURGICAL HISTORY Right     tendon repair in (R) forearm because of previous injury as child    TOTAL HIP ARTHROPLASTY Left 1/23/2020    LEFT HIP TOTAL HIP ARTHROPLASTY, RICARDO performed by Paulino Young MD at [facility] ENDOSCOPY  3/14/14    [address] 2013    Dr. Awais Roth office     Family History   Problem Relation Age of Onset    Cancer Mother         Throat & esophagus    Anemia Mother     Other Father         Natural causes    High Blood Pressure Sister     COPD Brother     Heart Disease Brother         Cardiac stent    Diabetes Sister     COPD Sister     Emphysema Sister     No Known Problems Daughter      Social History     Socioeconomic History    Marital status: Legally      Spouse name: Not on file    Number of children: Not on file    Years of education: Not on file    Highest education level: Not on file   Occupational History    Occupation: Disabled - because of back pain - 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    Vaping Use    Vaping Use: Former   Substance and Sexual Activity    Alcohol use: No     Alcohol/week: 0.0 standard drinks    Drug use: No    Sexual activity: Not on file   Other Topics Concern    Not on file   Social History Narrative    Not on file     Social Determinants of Health     Financial Resource Strain: Not on file   Food Insecurity: Not on file   Transportation Needs: Not on file   Physical Activity: Not on file   Stress: Not on file   Social Connections: Not on file   Intimate Partner Violence: Not on file   Housing Stability: Not on file       Current Outpatient Medications on File Prior to Visit   Medication Sig Dispense Refill    DULoxetine (CYMBALTA) 60 MG extended release capsule Take 1 capsule by mouth daily 30 capsule 2    baclofen (LIORESAL) 10 MG tablet Take 1 tablet by mouth daily as needed (pain) 30 tablet 2    olmesartan (BENICAR) 40 MG tablet take 1 tablet by mouth every morning      amLODIPine (NORVASC) 10 MG tablet take 1 tablet by mouth once daily      atenolol (TENORMIN) 50 MG tablet Take by mouth      atenolol-chlorthalidone (TENORETIC) 50-25 MG per tablet daily  0    Elastic Bandages & Supports (T.E.D. BELOW KNEE/L-REGULAR) MISC 1 applicator by Does not apply route daily 1 each 0    losartan-hydrochlorothiazide (HYZAAR) 100-25 MG per tablet Take by mouth daily       Respiratory Therapy Supplies (FLUTTER) PANCHO 1 Device by Does not apply route 4 times daily 1 Device 0    metFORMIN (GLUCOPHAGE) 500 MG tablet Take 500 mg by mouth 2 times daily (with meals)      fenofibrate 160 MG tablet Take by mouth daily       folic acid (FOLVITE) 1 MG tablet Take 1 mg by mouth daily       amLODIPine-benazepril (LOTREL) 5-10 MG per capsule Take 1 capsule by mouth daily       ibuprofen (ADVIL;MOTRIN) 400 MG tablet Take 1 tablet by mouth daily as needed for Pain 30 tablet 2    aspirin 81 MG EC tablet Take 1 tablet by mouth 2 times daily 30 tablet 0    nicotine (NICODERM CQ) 21 MG/24HR Place 1 patch onto the skin daily 42 patch 0    ciclopirox (PENLAC) 8 % solution Apply topically to the toenails nightly. (Patient not taking: No sig reported) 1 Bottle 5     No current facility-administered medications on file prior to visit. Pt presents today for a f/u of chronic low back pain from [facility] in early 2000s pain. No changes in chronic pain. Pt feels pain level and functioning improves with prescribed medications and is able to perform ADLs. Pt feels that prolonged sitting aggravates the pain. Pt c/o LLE radiating numbness and tingling around the shin and calf which is intermittent. History of mild bilateral CTS, DM, sickle cell. Last flare of sickle cell crisis in June 2021. He has a history of lumbar fusion, spinal cord stimulator, L hip replacement 1/23/21 with Dr. Meggan Ramírez. He goes into crisis if he has stress and high BP. He uses his SCS constantly. He continues to use his Lyrica 150mg TID and Norco 10mg 2-3 per day PRN pain and Cymbalta 60mg daily and baclofen 10mg BID PRN spasm and Ibuprofen PRN. He is on disability. Review of Systems   Constitutional:  Negative for appetite change and fever. HENT:  Negative for hearing loss. Eyes:  Negative for visual disturbance. Respiratory:  Negative for shortness of breath. Gastrointestinal:  Negative for blood in stool. Genitourinary:  Negative for difficulty urinating and hematuria. Musculoskeletal:  Positive for arthralgias and back pain. Skin:  Negative for rash. Neurological:  Negative for facial asymmetry. Hematological:  Negative for adenopathy. Psychiatric/Behavioral:  Negative for self-injury. All other systems reviewed and are negative.       Objective:     Vitals:  Temp 96.8 °F (36 °C)   Ht 6' 1\" (1.854 m)   Wt 213 lb (96.6 kg)   BMI 28.10 kg/m² Pain Score:   4      Physical Exam  Vitals and nursing note reviewed. Pt is alert and oriented x 3. Recent and remote memory is intact. Mood, judgement and affect are normal.  No signs of distress or SOB noted. Visualized skin intact. Sensation intact to light touch. Decreased ROM with flexion and extension of low back. Tender with palpation to bilateral lumbar spine with positive provacative maneuvers noted. Negative SLR. Pt is able to briefly heel walk and toe walk. Strength, balance, and coordination are functional for ambulation. Left lumbar SCS. Assessment:      Diagnosis Orders   1. Postlaminectomy syndrome, lumbar region  pregabalin (LYRICA) 150 MG capsule    HYDROcodone-acetaminophen (NORCO)  MG per tablet      2. L4-5 HERNAITED DISC  pregabalin (LYRICA) 150 MG capsule    HYDROcodone-acetaminophen (NORCO)  MG per tablet          Plan:     Periodic Controlled Substance Monitoring: Possible medication side effects, risk of tolerance/dependence & alternative treatments discussed., No signs of potential drug abuse or diversion identified. , Assessed functional status., Obtaining appropriate analgesic effect of treatment. (Buffy Hector, APRN - CNP)    Orders Placed This Encounter   Medications    pregabalin (LYRICA) 150 MG capsule     Sig: Take 1 capsule by mouth in the morning and 1 capsule at noon and 1 capsule before bedtime. Do all this for 30 days. Dispense:  90 capsule     Refill:  0    HYDROcodone-acetaminophen (NORCO)  MG per tablet     Sig: Take 1 tablet by mouth every 8-12 hours as needed for Pain for up to 30 days. #80 tabs for 30 days. Dispense:  80 tablet     Refill:  0     Reduce doses taken as pain becomes manageable       No orders of the defined types were placed in this encounter. Discussed options with the patient today.  Continue Cymbalta, Lyrica and Norco, Baclofen and Ibuprofen. No changes of chronic pain. No recent flare of sickle cell crisis. Again encourage to discuss switching to a different anti-depressant with PCP, but he will stay on Cymbalta for now. Continue to follow-up with other providers. SCS continues working well and helping, he is working on getting a new charging belt. All questions were answered. Pt verbalized understanding and agrees with above plan. Utox 3/9/22 appropriately positive for medication. Also positive for alcohol. Advised patient to avoid alcohol use. Narcan sent 4/21/2021. Will continue medications for chronic pain as they help pt function with ADL and improve quality of life. Discussed possible risks of opiate medication with pt, including but not limited to, constipation, nausea or vomiting, sedation, urinary retention, dependence and possible addiction. Pt agrees to use medication as directed. Pt advised to not use opiates while driving or operating heavy equipment, or in situations where pt may harm him/herself or others. Pt is aware that while on narcotics, pt needs to be seen monthly to reassess pain and need for continued medication. NDP reviewed. OARRS was reviewed. This NP saw pt under direct supervision of Dr. Leydi Ruff. Follow up:  Return in about 4 weeks (around 9/1/2022) for review meds and reassess pain.     DARLENE Stock - CNP Patient

## 2023-11-03 NOTE — ED ADULT NURSE NOTE - NS ED NOTE ABUSE SUSPICION NEGLECT YN
Pt becoming more agitated, unable to calm down at this time, hitting the bed, pacing. Ativan given as directed. Will continue to monitor. No